# Patient Record
Sex: FEMALE | Race: WHITE | Employment: FULL TIME | URBAN - NONMETROPOLITAN AREA
[De-identification: names, ages, dates, MRNs, and addresses within clinical notes are randomized per-mention and may not be internally consistent; named-entity substitution may affect disease eponyms.]

---

## 2021-11-03 ENCOUNTER — HOSPITAL ENCOUNTER (OUTPATIENT)
Dept: PHYSICAL THERAPY | Age: 61
Setting detail: THERAPIES SERIES
Discharge: HOME | End: 2021-11-03
Payer: COMMERCIAL

## 2021-11-03 PROCEDURE — 97110 THERAPEUTIC EXERCISES: CPT

## 2021-11-03 PROCEDURE — 97166 OT EVAL MOD COMPLEX 45 MIN: CPT

## 2021-11-03 PROCEDURE — 97530 THERAPEUTIC ACTIVITIES: CPT
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
## 2021-11-18 ENCOUNTER — HOSPITAL ENCOUNTER (OUTPATIENT)
Dept: PHYSICAL THERAPY | Age: 61
Setting detail: THERAPIES SERIES
End: 2021-11-18
Payer: COMMERCIAL

## 2021-11-30 ENCOUNTER — HOSPITAL ENCOUNTER (OUTPATIENT)
Dept: PHYSICAL THERAPY | Age: 61
Setting detail: THERAPIES SERIES
Discharge: HOME | End: 2021-11-30
Payer: COMMERCIAL

## 2021-11-30 PROCEDURE — 97530 THERAPEUTIC ACTIVITIES: CPT

## 2021-11-30 PROCEDURE — 97110 THERAPEUTIC EXERCISES: CPT

## 2021-11-30 PROCEDURE — 97140 MANUAL THERAPY 1/> REGIONS: CPT
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
## 2022-01-04 NOTE — DISCHARGE SUMMARY
[address] [address]  OUTPATIENT OCCUPATIONAL THERAPY QUICK DISCHARGE NOTE  Specialized Therapy Services    Date: 2022  Patient Name: Beena Price        CSN: [id number]   : 1960  (62 y.o.)  Gender: female   Leeta Clutter, DO,    Cystocele and Rectocele,      Patient is discharged from Occupational Therapy services at this time. See last note for details related to results of therapy and goal achievement. Reason for discharge: Insurance Issues. Pt would like to be discharged at this time due to expense of therapy due to her deductible resetting.        Annette LUU, OTR/L [id number]

## 2022-02-23 ENCOUNTER — HOSPITAL ENCOUNTER (OUTPATIENT)
Dept: PHYSICAL THERAPY | Age: 62
Setting detail: THERAPIES SERIES
Discharge: HOME | End: 2022-02-23